# Patient Record
Sex: MALE | Race: OTHER
[De-identification: names, ages, dates, MRNs, and addresses within clinical notes are randomized per-mention and may not be internally consistent; named-entity substitution may affect disease eponyms.]

---

## 2019-12-15 ENCOUNTER — HOSPITAL ENCOUNTER (EMERGENCY)
Dept: HOSPITAL 54 - ER | Age: 54
Discharge: HOME | End: 2019-12-15
Payer: COMMERCIAL

## 2019-12-15 VITALS — HEIGHT: 67 IN | WEIGHT: 175 LBS | BODY MASS INDEX: 27.47 KG/M2

## 2019-12-15 VITALS — SYSTOLIC BLOOD PRESSURE: 127 MMHG | DIASTOLIC BLOOD PRESSURE: 81 MMHG
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
## 2019-12-15 DIAGNOSIS — S61.411A: Primary | ICD-10-CM

## 2019-12-15 DIAGNOSIS — F43.10: ICD-10-CM

## 2019-12-15 DIAGNOSIS — Z88.8: ICD-10-CM
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
## 2019-12-15 PROCEDURE — 12002 RPR S/N/AX/GEN/TRNK2.6-7.5CM: CPT

## 2019-12-15 PROCEDURE — A6403 STERILE GAUZE>16 <= 48 SQ IN: HCPCS

## 2019-12-15 PROCEDURE — 99283 EMERGENCY DEPT VISIT LOW MDM: CPT

## 2019-12-15 PROCEDURE — 90471 IMMUNIZATION ADMIN: CPT

## 2019-12-15 PROCEDURE — 90715 TDAP VACCINE 7 YRS/> IM: CPT

## 2019-12-15 NOTE — NUR
PT CAME INTO THE ED C/O R HAND LACERATION DT  DOG BITE. PT AAOX4, VSS, 
BREATHING EVEN AND UNLABORED ON ROOM AIR W/ NAD NOTED.